# Patient Record
Sex: MALE | Race: BLACK OR AFRICAN AMERICAN | Employment: FULL TIME | URBAN - METROPOLITAN AREA
[De-identification: names, ages, dates, MRNs, and addresses within clinical notes are randomized per-mention and may not be internally consistent; named-entity substitution may affect disease eponyms.]

---

## 2017-06-08 ENCOUNTER — OFFICE VISIT (OUTPATIENT)
Dept: INTERNAL MEDICINE CLINIC | Age: 27
End: 2017-06-08

## 2017-06-08 ENCOUNTER — OFFICE VISIT (OUTPATIENT)
Dept: ORTHOPEDIC SURGERY | Age: 27
End: 2017-06-08

## 2017-06-08 VITALS
HEIGHT: 68 IN | RESPIRATION RATE: 16 BRPM | WEIGHT: 235 LBS | BODY MASS INDEX: 35.61 KG/M2 | OXYGEN SATURATION: 98 % | SYSTOLIC BLOOD PRESSURE: 137 MMHG | TEMPERATURE: 98.8 F | HEART RATE: 72 BPM | DIASTOLIC BLOOD PRESSURE: 79 MMHG

## 2017-06-08 VITALS
DIASTOLIC BLOOD PRESSURE: 85 MMHG | BODY MASS INDEX: 35.61 KG/M2 | OXYGEN SATURATION: 97 % | SYSTOLIC BLOOD PRESSURE: 145 MMHG | WEIGHT: 235 LBS | TEMPERATURE: 98.6 F | RESPIRATION RATE: 16 BRPM | HEIGHT: 68 IN | HEART RATE: 89 BPM

## 2017-06-08 DIAGNOSIS — M25.561 ACUTE PAIN OF RIGHT KNEE: Primary | ICD-10-CM

## 2017-06-08 DIAGNOSIS — M25.461 EFFUSION OF RIGHT KNEE: ICD-10-CM

## 2017-06-08 DIAGNOSIS — S83.004A PATELLAR DISLOCATION, RIGHT, INITIAL ENCOUNTER: Primary | ICD-10-CM

## 2017-06-08 RX ORDER — HYDROCODONE BITARTRATE AND ACETAMINOPHEN 7.5; 325 MG/1; MG/1
1 TABLET ORAL
Qty: 63 TAB | Refills: 0 | Status: SHIPPED | OUTPATIENT
Start: 2017-06-08 | End: 2017-06-29

## 2017-06-08 RX ORDER — HYDROCODONE BITARTRATE AND ACETAMINOPHEN 5; 325 MG/1; MG/1
TABLET ORAL
COMMUNITY
Start: 2017-06-04 | End: 2017-06-08 | Stop reason: DRUGHIGH

## 2017-06-08 RX ORDER — IBUPROFEN 800 MG/1
800 TABLET ORAL
Qty: 90 TAB | Refills: 1 | Status: SHIPPED | OUTPATIENT
Start: 2017-06-08

## 2017-06-08 RX ORDER — IBUPROFEN 800 MG/1
800 TABLET ORAL
COMMUNITY
Start: 2017-06-04 | End: 2017-06-08 | Stop reason: SDUPTHER

## 2017-06-08 NOTE — PROGRESS NOTES
Pt presented today with right knee injury on June 4, 2017, pt was seen at [address] [place] [address] . Has pt had any falls since last visit? No.  Pt preferred language for health care discussion is english. Advanced Directive? No    Is someone accompanying this pt? No    Is the patient using any DME equipment during OV? No      1. Have you been to the ER, urgent care clinic since your last visit? Hospitalized since your last visit? Yes When: June 4, 2017 [facility] ER knee injury    2. Have you seen or consulted any other health care providers outside of the [address] [place] [address] since your last visit? Include any pap smears or colon screening. No      Pt has a reminder for a \"due or due soon\" health maintenance. I have asked that he contact his primary care provider for follow-up on this health maintenance.

## 2017-06-08 NOTE — PATIENT INSTRUCTIONS
Knee Pain or Injury: Care Instructions  Your Care Instructions    Injuries are a common cause of knee problems. Sudden (acute) injuries may be caused by a direct blow to the knee. They can also be caused by abnormal twisting, bending, or falling on the knee. Pain, bruising, or swelling may be severe, and may start within minutes of the injury. Overuse is another cause of knee pain. Other causes are climbing stairs, kneeling, and other activities that use the knee. Everyday wear and tear, especially as you get older, also can cause knee pain. Rest, along with home treatment, often relieves pain and allows your knee to heal. If you have a serious knee injury, you may need tests and treatment. Follow-up care is a key part of your treatment and safety. Be sure to make and go to all appointments, and call your doctor if you are having problems. It's also a good idea to know your test results and keep a list of the medicines you take. How can you care for yourself at home? · Be safe with medicines. Read and follow all instructions on the label. ¨ If the doctor gave you a prescription medicine for pain, take it as prescribed. ¨ If you are not taking a prescription pain medicine, ask your doctor if you can take an over-the-counter medicine. · Rest and protect your knee. Take a break from any activity that may cause pain. · Put ice or a cold pack on your knee for 10 to 20 minutes at a time. Put a thin cloth between the ice and your skin. · Prop up a sore knee on a pillow when you ice it or anytime you sit or lie down for the next 3 days. Try to keep it above the level of your heart. This will help reduce swelling. · If your knee is not swollen, you can put moist heat, a heating pad, or a warm cloth on your knee. · If your doctor recommends an elastic bandage, sleeve, or other type of support for your knee, wear it as directed.   · Follow your doctor's instructions about how much weight you can put on your leg. Use a cane, crutches, or a walker as instructed. · Follow your doctor's instructions about activity during your healing process. If you can do mild exercise, slowly increase your activity. · Reach and stay at a healthy weight. Extra weight can strain the joints, especially the knees and hips, and make the pain worse. Losing even a few pounds may help. When should you call for help? Call 911 anytime you think you may need emergency care. For example, call if:  · You have symptoms of a blood clot in your lung (called a pulmonary embolism). These may include:  ¨ Sudden chest pain. ¨ Trouble breathing. ¨ Coughing up blood. Call your doctor now or seek immediate medical care if:  · You have severe or increasing pain. · Your leg or foot turns cold or changes color. · You cannot stand or put weight on your knee. · Your knee looks twisted or bent out of shape. · You cannot move your knee. · You have signs of infection, such as:  ¨ Increased pain, swelling, warmth, or redness. ¨ Red streaks leading from the knee. ¨ Pus draining from a place on your knee. ¨ A fever. · You have signs of a blood clot in your leg (called a deep vein thrombosis), such as:  ¨ Pain in your calf, back of the knee, thigh, or groin. ¨ Redness and swelling in your leg or groin. Watch closely for changes in your health, and be sure to contact your doctor if:  · You have tingling, weakness, or numbness in your knee. · You have any new symptoms, such as swelling. · You have bruises from a knee injury that last longer than 2 weeks. · You do not get better as expected. Where can you learn more? Go to http://jenna-wilian.info/. Enter K195 in the search box to learn more about \"Knee Pain or Injury: Care Instructions. \"  Current as of: May 27, 2016  Content Version: 11.2  © 2268-8205 [employer].  Care instructions adapted under license by [employer] (which disclaims liability or warranty for this information). If you have questions about a medical condition or this instruction, always ask your healthcare professional. [address] any warranty or liability for your use of this information. Learning About RICE (Rest, Ice, Compression, and Elevation)  What is RICE? RICE is a way to care for an injury. RICE helps relieve pain and swelling. It may also help with healing and flexibility. RICE stands for:  · Rest and protect the injured or sore area. · Ice or a cold pack used as soon as possible. · Compression, or wrapping the injured or sore area with an elastic bandage. · Elevation (propping up) the injured or sore area. How do you do RICE? You can use RICE for home treatment when you have general aches and pains or after an injury or surgery. Rest  · Do not put weight on the injury for at least 24 to 48 hours. · Use crutches for a badly sprained knee or ankle. · Support a sprained wrist, elbow, or shoulder with a sling. Ice  · Put ice or a cold pack on the injury right away to reduce pain and swelling. Frozen vegetables will also work as an ice pack. Put a thin cloth between the ice or cold pack and your skin. The cloth protects the injured area from getting too cold. · Use ice for 10 to 15 minutes at a time for the first 48 to 72 hours. Compression  · Use compression for sprains, strains, and surgeries of the arms and legs. · Wrap the injured area with an elastic bandage or compression sleeve to reduce swelling. · Don't wrap it too tightly. If the area below it feels numb, tingles, or feels cool, loosen the wrap. Elevation  · Use elevation for areas of the body that can be propped up, such as arms and legs. · Prop up the injured area on pillows whenever you use ice. Keep it propped up anytime you sit or lie down. · Try to keep the injured area at or above the level of your heart. This will help reduce swelling and bruising. Where can you learn more?   Go to http://phil.info/. Enter C225 in the search box to learn more about \"Learning About RICE (Rest, Ice, Compression, and Elevation). \"  Current as of: May 23, 2016  Content Version: 11.2  © 1218-3543 [employer]. Care instructions adapted under license by [employer] (which disclaims liability or warranty for this information). If you have questions about a medical condition or this instruction, always ask your healthcare professional. [place] [address] any warranty or liability for your use of this information.

## 2017-06-08 NOTE — PROGRESS NOTES
HISTORY OF PRESENT ILLNESS  Mansoor Mccartney is a 31 y.o. male. Patient presents with traumatic right knee pain x 4 days. States he he had a knee injury while he was playing basketball,wemt to [facility], was [facility] and sent home with knee immobilizer, crutches and was given two days off work. 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 States he is still taking the Norco but only has two more pills left. States he is still has Ibuprofen. Patient rates right knee pain as 8/10. Evident right knee swelling with effusion, painful ROM and flexion,good extension. Patient denies any numbness,tingling. Knee Injury    The history is provided by the patient. This is a new problem. The current episode started more than 2 days ago. The problem occurs constantly. The problem has been gradually worsening. The pain is present in the right knee. The pain is at a severity of 8/10. Associated symptoms include limited range of motion. Pertinent negatives include no numbness. Treatments tried: Norco, Ibuprofen. The treatment provided mild relief. There has been a history of trauma. Review of Systems   Constitutional: Negative. HENT: Negative. Eyes: Negative. Respiratory: Negative. Cardiovascular: Negative. Gastrointestinal: Negative. Genitourinary: Negative. Musculoskeletal: Positive for joint pain. Right knee swelling with effusion, painful ROM and flexion,good extension. Patient denies any numbness,tingling, good cap refill and good sensation. Skin: Negative. Neurological: Negative. Negative for numbness. Psychiatric/Behavioral: Negative. Physical Exam   Constitutional: He is oriented to person, place, and time. He appears well-developed and well-nourished.    /85 (BP 1 Location: Right arm, BP Patient Position: Sitting)  Pulse 89  Temp 98.6 °F (37 °C) (Oral)   Resp 16  Ht 5' 8\" (1.727 m)  Wt 235 lb (106.6 kg)  SpO2 97%  BMI 35.73 kg/m2     HENT:   Head: Normocephalic and atraumatic. Eyes: Conjunctivae and EOM are normal. Pupils are equal, round, and reactive to light. Neck: Normal range of motion. Cardiovascular: Normal rate. Pulmonary/Chest: Effort normal and breath sounds normal.   Abdominal: Soft. Bowel sounds are normal.   Musculoskeletal:        Right knee: He exhibits decreased range of motion, swelling and effusion. Tenderness found. Neurological: He is alert and oriented to person, place, and time. Skin: Skin is warm and dry. Psychiatric: He has a normal mood and affect. His behavior is normal. Judgment and thought content normal.   Vitals reviewed. ASSESSMENT and PLAN    ICD-10-CM ICD-9-CM    1. Acute pain of right knee M25.561 719.46 REFERRAL TO ORTHOPEDICS     Encounter Diagnoses   Name Primary?  Acute pain of right knee Yes     Orders Placed This Encounter    REFERRAL TO ORTHOPEDICS    HYDROcodone-acetaminophen (NORCO) 5-325 mg per tablet    ibuprofen (MOTRIN) 800 mg tablet     Orders Placed This Encounter    REFERRAL TO ORTHOPEDICS     Referral Priority:   Urgent     Referral Type:   Consultation     Referral Reason:   Specialty Services Required     Referral Location:   [facility]     Referred to Provider:   Nico Gregg DO     Orders Placed This Encounter    REFERRAL TO [facility] was seen today for knee injury. Diagnoses and all orders for this visit:    Acute pain of right knee  -     REFERRAL TO ORTHOPEDICS      Follow-up Disposition:  Return if symptoms worsen or fail to improve.   current treatment plan is effective, no change in therapy  the following changes in treatment are made: Same day referral to in house ortho

## 2017-06-08 NOTE — MR AVS SNAPSHOT
Visit Information Date & Time Provider Department Dept. Phone Encounter #  
 6/8/2017 10:20 AM Sherman Lopez, [phone] [id number] Follow-up Instructions Return in about 3 weeks (around 6/29/2017) for patellar dislocation. Routing History Follow-up and Disposition History Upcoming Health Maintenance Date Due DTaP/Tdap/Td series (1 - Tdap) 10/31/2010 INFLUENZA AGE 9 TO ADULT 8/1/2017 Allergies as of 6/8/2017  Review Complete On: 6/8/2017 By: Sherman Lopez DO No Known Allergies Current Immunizations  Never Reviewed No immunizations on file. Not reviewed this visit You Were Diagnosed With   
  
 Codes Comments Patellar dislocation, right, initial encounter    -  Primary ICD-10-CM: Y78.913I ICD-9-CM: 836.3 Effusion of right knee     ICD-10-CM: M25.461 ICD-9-CM: 719.06 Vitals BP Pulse Temp Resp Height(growth percentile) Weight(growth percentile)  
 137/79 (BP 1 Location: Right arm, BP Patient Position: Sitting) 72 98.8 °F (37.1 °C) (Oral) 16 5' 8\" (1.727 m) 235 lb (106.6 kg) SpO2 BMI Smoking Status 98% 35.73 kg/m2 Never Smoker Vitals History BMI and BSA Data Body Mass Index Body Surface Area 35.73 kg/m 2 2.26 m 2 Preferred Pharmacy Pharmacy Name Phone [id number] [phone] Your Updated Medication List  
  
   
This list is accurate as of: 6/8/17 11:29 AM.  Always use your most recent med list.  
  
  
  
  
 chlorpheniramine-HYDROcodone 10-8 mg/5 mL suspension Commonly known as:  Petar Taos Take 5 mL by mouth every twelve (12) hours as needed for Cough. HYDROcodone-acetaminophen 7.5-325 mg per tablet Commonly known as:  Ann Schlichter Take 1 Tab by mouth every eight (8) hours as needed for Pain for up to 21 days. Max Daily Amount: 3 Tabs. ibuprofen 800 mg tablet Commonly known as:  MOTRIN Take 1 Tab by mouth every eight (8) hours as needed for Pain. MUCINEX 600 mg SR tablet Generic drug:  guaiFENesin SR Take 600 mg by mouth two (2) times a day. Prescriptions Printed Refills HYDROcodone-acetaminophen (NORCO) 7.5-325 mg per tablet 0 Sig: Take 1 Tab by mouth every eight (8) hours as needed for Pain for up to 21 days. Max Daily Amount: 3 Tabs. Class: Print Route: Oral  
  
Prescriptions Sent to Pharmacy Refills  
 ibuprofen (MOTRIN) 800 mg tablet 1 Sig: Take 1 Tab by mouth every eight (8) hours as needed for Pain. Class: Normal  
 Pharmacy: [address] [facility], [address] [facility]  #: [phone] Route: Oral  
  
We Performed the Following REFERRAL TO PHYSICAL THERAPY [DEM54 Custom] Comments:  
 Eval and Tx 
 
3 per week for 4-6 weeks Iontophoresis and/or dry needling if indicated. [phone] Follow-up Instructions Return in about 3 weeks (around 6/29/2017) for patellar dislocation. Referral Information Referral ID Referred By Referred To  
  
 [id number]   
   [address]   
   [address] [facility], [address] [place] [address] Phone: [phone] Fax: [phone] Visits Status Start Date End Date 1 New Request 6/8/17 6/8/18 If your referral has a status of pending review or denied, additional information will be sent to support the outcome of this decision. Patient Instructions Ice 15-20 minutes at night to get rid of swelling, then neoprene Knee Sleeve during the day to keep it from coming back. May take off at night. If slipping down can fold an inch of the top over or spray knee with a little hair spray prior to putting on. Kneecap Dislocation: Care Instructions Your Care Instructions A sudden twisting or a blow can cause the kneecap (patella) to move out of its normal position. This is called a dislocation. It can happen because of a sports injurysuch as turning suddenly while runningor an accident. Rest and home treatment can help you heal and return to your normal activity, usually within 3 to 6 weeks. But you need to be careful after you heal. Now that your kneecap has been dislocated, it can more easily go out of position again. Follow-up care is a key part of your treatment and safety. Be sure to make and go to all appointments, and call your doctor if you are having problems. It's also a good idea to know your test results and keep a list of the medicines you take. How can you care for yourself at home? · Take pain medicines exactly as directed. ¨ If the doctor gave you a prescription medicine for pain, take it as prescribed. ¨ If you are not taking a prescription pain medicine, ask your doctor if you can take an over-the-counter medicine. · Rest your knee by not putting weight on your leg until your doctor says it is okay. · Follow instructions for using crutches. · Your doctor may recommend a brace (immobilizer) or elastic bandage to support your knee while it heals. Wear it as directed. · If you have an elastic bandage, make sure it is snug but not so tight that your leg is numb, tingles, or swells below the bandage. You can loosen the bandage if it is too tight. · Put ice or a cold pack on your knee for 10 to 20 minutes at a time. Try to do this every 1 to 2 hours for the next 3 days (when you are awake) or until the swelling goes down. Put a thin cloth between the ice pack and your skin. Do not get the brace or elastic bandage wet. · Prop up your leg on a pillow when you ice it or anytime you sit or lie down for the next 3 days. Try to keep it above the level of your heart. This will help reduce swelling. · Go to physical therapy if your doctor suggests it. Follow your therapist's instruction for home exercises. When should you call for help? Call 911 anytime you think you may need emergency care. For example, call if: 
· You have sudden chest pain and shortness of breath, or you cough up blood. Call your doctor now or seek immediate medical care if: 
· You have signs that your kneecap may be dislocated again, including: ¨ Severe pain. ¨ A misshapen knee that looks like a bone is out of position. ¨ Not being able to bend or straighten the knee. ¨ Not being able to walk or bear weight on the knee. · Your foot is cool or pale or changes color. · You cannot feel or move your toes or ankle. · You have signs of a blood clot, such as: 
¨ Pain in your calf, back of the knee, thigh, or groin. ¨ Redness and swelling in your leg. Watch closely for changes in your health, and be sure to contact your doctor if: 
· Your pain and swelling get worse. Where can you learn more? Go to http://jenna-wilian.info/. Enter E319 in the search box to learn more about \"Kneecap Dislocation: Care Instructions. \" Current as of: May 23, 2016 Content Version: 11.2 © 4399-1926 [employer]. Care instructions adapted under license by [employer] (which disclaims liability or warranty for this information). If you have questions about a medical condition or this instruction, always ask your healthcare professional. [place] [address] any warranty or liability for your use of this information. Introducing [facility]! [facility] introduces [employer] patient portal. Now you can access parts of your medical record, email your doctor's office, and request medication refills online. 1. In your internet browser, go to [email or web address] 2. Click on the First Time User? Click Here link in the Sign In box. You will see the New Member Sign Up page. 3. Enter your [employer] Access Code exactly as it appears below.  You will not need to use this code after youve completed the sign-up process. If you do not sign up before the expiration date, you must request a new code. · [facility] Access Code: [email or web address] Expires: 9/6/2017 10:17 AM 
 
4. Enter the last four digits of your Social Security Number (xxxx) and Date of Birth (mm/dd/yyyy) as indicated and click Submit. You will be taken to the next sign-up page. 5. Create a [facility] ID. This will be your [facility] login ID and cannot be changed, so think of one that is secure and easy to remember. 6. Create a [facility] password. You can change your password at any time. 7. Enter your Password Reset Question and Answer. This can be used at a later time if you forget your password. 8. Enter your e-mail address. You will receive e-mail notification when new information is available in [address] 9. Click Sign Up. You can now view and download portions of your medical record. 10. Click the Download Summary menu link to download a portable copy of your medical information. If you have questions, please visit the Frequently Asked Questions section of the [facility] website. Remember, [facility] is NOT to be used for urgent needs. For medical emergencies, dial 911. Now available from your iPhone and Android! Please provide this summary of care documentation to your next provider. Your primary care clinician is listed as Hannah Harmon. If you have any questions after today's visit, please call [phone].

## 2017-06-08 NOTE — LETTER
NOTIFICATION RETURN TO WORK / SCHOOL 
 
6/8/2017 11:19 AM 
 
Mr. Kahlil Moy [address] [facility] [id number] To Whom It May Concern: 
 
Kahlil Moy is currently under the care of [address] [place] [address]. He will return to work/school once condition improves. Likely approximately 4 weeks. If there are questions or concerns please have the patient contact our office.  
 
 
 
Sincerely, 
 
 
Cyndie Delgadillo, DO

## 2017-06-08 NOTE — PROGRESS NOTES
1. Have you been to the ER, urgent care clinic since your last visit? Hospitalized since your last visit? new to  practice    2. Have you seen or consulted any other health care providers outside of the [address] [place] [address] since your last visit? Include any pap smears or colon screening.   new to  practice

## 2017-06-08 NOTE — PATIENT INSTRUCTIONS
Ice 15-20 minutes at night to get rid of swelling, then neoprene Knee Sleeve during the day to keep it from coming back. May take off at night. If slipping down can fold an inch of the top over or spray knee with a little hair spray prior to putting on. Kneecap Dislocation: Care Instructions  Your Care Instructions    A sudden twisting or a blow can cause the kneecap (patella) to move out of its normal position. This is called a dislocation. It can happen because of a sports injury--such as turning suddenly while running--or an accident. Rest and home treatment can help you heal and return to your normal activity, usually within 3 to 6 weeks. But you need to be careful after you heal. Now that your kneecap has been dislocated, it can more easily go out of position again. Follow-up care is a key part of your treatment and safety. Be sure to make and go to all appointments, and call your doctor if you are having problems. It's also a good idea to know your test results and keep a list of the medicines you take. How can you care for yourself at home? · Take pain medicines exactly as directed. ¨ If the doctor gave you a prescription medicine for pain, take it as prescribed. ¨ If you are not taking a prescription pain medicine, ask your doctor if you can take an over-the-counter medicine. · Rest your knee by not putting weight on your leg until your doctor says it is okay. · Follow instructions for using crutches. · Your doctor may recommend a brace (immobilizer) or elastic bandage to support your knee while it heals. Wear it as directed. · If you have an elastic bandage, make sure it is snug but not so tight that your leg is numb, tingles, or swells below the bandage. You can loosen the bandage if it is too tight. · Put ice or a cold pack on your knee for 10 to 20 minutes at a time. Try to do this every 1 to 2 hours for the next 3 days (when you are awake) or until the swelling goes down.  Put a thin cloth between the ice pack and your skin. Do not get the brace or elastic bandage wet. · Prop up your leg on a pillow when you ice it or anytime you sit or lie down for the next 3 days. Try to keep it above the level of your heart. This will help reduce swelling. · Go to physical therapy if your doctor suggests it. Follow your therapist's instruction for home exercises. When should you call for help? Call 911 anytime you think you may need emergency care. For example, call if:  · You have sudden chest pain and shortness of breath, or you cough up blood. Call your doctor now or seek immediate medical care if:  · You have signs that your kneecap may be dislocated again, including:  ¨ Severe pain. ¨ A misshapen knee that looks like a bone is out of position. ¨ Not being able to bend or straighten the knee. ¨ Not being able to walk or bear weight on the knee. · Your foot is cool or pale or changes color. · You cannot feel or move your toes or ankle. · You have signs of a blood clot, such as:  ¨ Pain in your calf, back of the knee, thigh, or groin. ¨ Redness and swelling in your leg. Watch closely for changes in your health, and be sure to contact your doctor if:  · Your pain and swelling get worse. Where can you learn more? Go to http://jenna-wilian.info/. Enter K408 in the search box to learn more about \"Kneecap Dislocation: Care Instructions. \"  Current as of: May 23, 2016  Content Version: 11.2  © 1596-7562 Healthwise, Incorporated. Care instructions adapted under license by [employer] (which disclaims liability or warranty for this information). If you have questions about a medical condition or this instruction, always ask your healthcare professional. [place] [address] any warranty or liability for your use of this information.

## 2017-06-08 NOTE — MR AVS SNAPSHOT
Visit Information Date & Time Provider Department Dept. Phone Encounter #  
 6/8/2017  9:45 AM Dafne Hidalgo NP e[facility]metry [phone] [id number] Follow-up Instructions Return if symptoms worsen or fail to improve. Upcoming Health Maintenance Date Due DTaP/Tdap/Td series (1 - Tdap) 10/31/2010 INFLUENZA AGE 9 TO ADULT 8/1/2017 Allergies as of 6/8/2017  Review Complete On: 6/8/2017 By: Meredith Newton LPN No Known Allergies Current Immunizations  Never Reviewed No immunizations on file. Not reviewed this visit You Were Diagnosed With   
  
 Codes Comments Acute pain of right knee    -  Primary ICD-10-CM: M25.561 ICD-9-CM: 719.46 Vitals BP Pulse Temp Resp Height(growth percentile) Weight(growth percentile) 145/85 (BP 1 Location: Right arm, BP Patient Position: Sitting) 89 98.6 °F (37 °C) (Oral) 16 5' 8\" (1.727 m) 235 lb (106.6 kg) SpO2 BMI Smoking Status 97% 35.73 kg/m2 Never Smoker Vitals History BMI and BSA Data Body Mass Index Body Surface Area 35.73 kg/m 2 2.26 m 2 Preferred Pharmacy Pharmacy Name Phone [id number], [address] [place] [address] [phone] Your Updated Medication List  
  
   
This list is accurate as of: 6/8/17 10:17 AM.  Always use your most recent med list.  
  
  
  
  
 chlorpheniramine-HYDROcodone 10-8 mg/5 mL suspension Commonly known as:  Lesleigh Frohna Take 5 mL by mouth every twelve (12) hours as needed for Cough. HYDROcodone-acetaminophen 5-325 mg per tablet Commonly known as:  Mitra Gowers Take 1 Tab by Mouth Every 4 Hours As Needed for up to 10 days. ibuprofen 800 mg tablet Commonly known as:  MOTRIN  
800 mg. MUCINEX 600 mg SR tablet Generic drug:  guaiFENesin SR Take 600 mg by mouth two (2) times a day. We Performed the Following REFERRAL TO ORTHOPEDICS [QTM283 Custom] Comments:  
 Please evaluate patient for Traumatic right knee pain x 4 days with exacerbation. Follow-up Instructions Return if symptoms worsen or fail to improve. Referral Information Referral ID Referred By Referred To  
  
 [id number] [address] [facility], [address] [place] [address] [facility] [phone] [facility] [place] Phone: [phone] Fax: [phone] Visits Status Start Date End Date 1 New Request 6/8/17 6/8/18 If your referral has a status of pending review or denied, additional information will be sent to support the outcome of this decision. Patient Instructions Knee Pain or Injury: Care Instructions Your Care Instructions Injuries are a common cause of knee problems. Sudden (acute) injuries may be caused by a direct blow to the knee. They can also be caused by abnormal twisting, bending, or falling on the knee. Pain, bruising, or swelling may be severe, and may start within minutes of the injury. Overuse is another cause of knee pain. Other causes are climbing stairs, kneeling, and other activities that use the knee. Everyday wear and tear, especially as you get older, also can cause knee pain. Rest, along with home treatment, often relieves pain and allows your knee to heal. If you have a serious knee injury, you may need tests and treatment. Follow-up care is a key part of your treatment and safety. Be sure to make and go to all appointments, and call your doctor if you are having problems. It's also a good idea to know your test results and keep a list of the medicines you take. How can you care for yourself at home? · Be safe with medicines. Read and follow all instructions on the label. ¨ If the doctor gave you a prescription medicine for pain, take it as prescribed.  
¨ If you are not taking a prescription pain medicine, ask your doctor if you can take an over-the-counter medicine. · Rest and protect your knee. Take a break from any activity that may cause pain. · Put ice or a cold pack on your knee for 10 to 20 minutes at a time. Put a thin cloth between the ice and your skin. · Prop up a sore knee on a pillow when you ice it or anytime you sit or lie down for the next 3 days. Try to keep it above the level of your heart. This will help reduce swelling. · If your knee is not swollen, you can put moist heat, a heating pad, or a warm cloth on your knee. · If your doctor recommends an elastic bandage, sleeve, or other type of support for your knee, wear it as directed. · Follow your doctor's instructions about how much weight you can put on your leg. Use a cane, crutches, or a walker as instructed. · Follow your doctor's instructions about activity during your healing process. If you can do mild exercise, slowly increase your activity. · Reach and stay at a healthy weight. Extra weight can strain the joints, especially the knees and hips, and make the pain worse. Losing even a few pounds may help. When should you call for help? Call 911 anytime you think you may need emergency care. For example, call if: 
· You have symptoms of a blood clot in your lung (called a pulmonary embolism). These may include: 
¨ Sudden chest pain. ¨ Trouble breathing. ¨ Coughing up blood. Call your doctor now or seek immediate medical care if: 
· You have severe or increasing pain. · Your leg or foot turns cold or changes color. · You cannot stand or put weight on your knee. · Your knee looks twisted or bent out of shape. · You cannot move your knee. · You have signs of infection, such as: 
¨ Increased pain, swelling, warmth, or redness. ¨ Red streaks leading from the knee. ¨ Pus draining from a place on your knee. ¨ A fever. · You have signs of a blood clot in your leg (called a deep vein thrombosis), such as: ¨ Pain in your calf, back of the knee, thigh, or groin. ¨ Redness and swelling in your leg or groin. Watch closely for changes in your health, and be sure to contact your doctor if: 
· You have tingling, weakness, or numbness in your knee. · You have any new symptoms, such as swelling. · You have bruises from a knee injury that last longer than 2 weeks. · You do not get better as expected. Where can you learn more? Go to http://jenna-wilian.info/. Enter K195 in the search box to learn more about \"Knee Pain or Injury: Care Instructions. \" Current as of: May 27, 2016 Content Version: 11.2 © 8992-1702 [employer]. Care instructions adapted under license by [employer] (which disclaims liability or warranty for this information). If you have questions about a medical condition or this instruction, always ask your healthcare professional. [place] [address] any warranty or liability for your use of this information. Learning About RICE (Rest, Ice, Compression, and Elevation) What is RICE? RICE is a way to care for an injury. RICE helps relieve pain and swelling. It may also help with healing and flexibility. RICE stands for: · Rest and protect the injured or sore area. · Ice or a cold pack used as soon as possible. · Compression, or wrapping the injured or sore area with an elastic bandage. · Elevation (propping up) the injured or sore area. How do you do RICE? You can use RICE for home treatment when you have general aches and pains or after an injury or surgery. Rest 
· Do not put weight on the injury for at least 24 to 48 hours. · Use crutches for a badly sprained knee or ankle. · Support a sprained wrist, elbow, or shoulder with a sling. Ice · Put ice or a cold pack on the injury right away to reduce pain and swelling. Frozen vegetables will also work as an ice pack.  Put a thin cloth between the ice or cold pack and your skin. The cloth protects the injured area from getting too cold. · Use ice for 10 to 15 minutes at a time for the first 48 to 72 hours. Compression · Use compression for sprains, strains, and surgeries of the arms and legs. · Wrap the injured area with an elastic bandage or compression sleeve to reduce swelling. · Don't wrap it too tightly. If the area below it feels numb, tingles, or feels cool, loosen the wrap. Elevation · Use elevation for areas of the body that can be propped up, such as arms and legs. · Prop up the injured area on pillows whenever you use ice. Keep it propped up anytime you sit or lie down. · Try to keep the injured area at or above the level of your heart. This will help reduce swelling and bruising. Where can you learn more? Go to http://jenna-wilian.info/. Enter F465 in the search box to learn more about \"Learning About RICE (Rest, Ice, Compression, and Elevation). \" 
Current as of: May 23, 2016 Content Version: 11.2 © 5271-4417 [employer]. Care instructions adapted under license by [employer] (which disclaims liability or warranty for this information). If you have questions about a medical condition or this instruction, always ask your healthcare professional. [address] any warranty or liability for your use of this information. Introducing [facility]! Lashawn Crowe introduces [employer] patient portal. Now you can access parts of your medical record, email your doctor's office, and request medication refills online. 1. In your internet browser, go to [email or web address] 2. Click on the First Time User? Click Here link in the Sign In box. You will see the New Member Sign Up page. 3. Enter your [employer] Access Code exactly as it appears below. You will not need to use this code after youve completed the sign-up process.  If you do not sign up before the expiration date, you must request a new code. · [employer] Access Code: [email or web address] Expires: 9/6/2017 10:17 AM 
 
4. Enter the last four digits of your Social Security Number (xxxx) and Date of Birth (mm/dd/yyyy) as indicated and click Submit. You will be taken to the next sign-up page. 5. Create a [employer] ID. This will be your [employer] login ID and cannot be changed, so think of one that is secure and easy to remember. 6. Create a [employer] password. You can change your password at any time. 7. Enter your Password Reset Question and Answer. This can be used at a later time if you forget your password. 8. Enter your e-mail address. You will receive e-mail notification when new information is available in [address] 9. Click Sign Up. You can now view and download portions of your medical record. 10. Click the Download Summary menu link to download a portable copy of your medical information. If you have questions, please visit the Frequently Asked Questions section of the [employer] website. Remember, [employer] is NOT to be used for urgent needs. For medical emergencies, dial 911. Now available from your iPhone and Android! Please provide this summary of care documentation to your next provider. Your primary care clinician is listed as [place]. If you have any questions after today's visit, please call [phone].

## 2017-06-08 NOTE — PROGRESS NOTES
HISTORY OF PRESENT ILLNESS    [place] Rock is a 31y.o. year old male comes in today as new patient to be evaluated and treated at the request of NP Castro Kramer for my opinion/advice regarding: Right knee pain    [place] [facility] [place] right stop and pivoted to left and knee cap shifted out. Went to ER and was relocated while getting Xray. Per records normal xray and Tx immob, crutches and motrin/norco.  Had been out of work until Tuesday but as a  cannot work in immob and pain bad getting into and out of crutches. No Hx prior. Rated 8/10. Social History     Social History    Marital status:      Spouse name: N/A    Number of children: N/A    Years of education: N/A     Social History Main Topics    Smoking status: Never Smoker    Smokeless tobacco: Never Used    Alcohol use No    Drug use: No    Sexual activity: Yes     Partners: Female     Birth control/ protection: Condom     Other Topics Concern    Not on file     Social History Narrative     Current Outpatient Prescriptions   Medication Sig Dispense Refill    HYDROcodone-acetaminophen (NORCO) 5-325 mg per tablet Take 1 Tab by Mouth Every 4 Hours As Needed for up to 10 days.  ibuprofen (MOTRIN) 800 mg tablet 800 mg.  chlorpheniramine-HYDROcodone (TUSSIONEX) 8-10 mg/5 mL suspension Take 5 mL by mouth every twelve (12) hours as needed for Cough. 120 mL 0    guaifenesin SR (MUCINEX) 600 mg SR tablet Take 600 mg by mouth two (2) times a day. No past medical history on file. No family history on file. ROS:  + swell. All other systems reviewed and negative aside from that written in the HPI. Objective:  Visit Vitals    Resp 16    Ht 5' 8\" (1.727 m)    Wt 235 lb (106.6 kg)    BMI 35.73 kg/m2     HEENT: Conjunctiva/lids WNL. External canals/nares WNL. Tongue midline. PERRL, EOMI. Hearing intact. NECK: Trachea midline. Supple, Full ROM. No thyromegaly.   CARDIAC: no edema  LUNGS: normal effort. ABD: Soft, no masses. No HSM. PSYCH: A+O x3. Appropriate judgment and insight. GEN: Appears stated age in NAD. HEAD:  Normocephalic, atraumatic. NEURO:  Sensation intact light touch B/L lower extremities. MS:  LE Strength +5/5 bilateral .   right Knee:  3+ Effusion . Lachman negative. Valgus negative. Varus negative. negative joint line tenderness medial, lateral.  Lexi negative. Posterior drawer negative. Noble compression test negative. Patellar apprehension positive. Patellar facet  positive tender to palpation medial, lateral no crepitance bilateral .  EXT: no clubbing/cyanosis. no edema. SKIN: Warm/dry without rash. Assessment/Plan:     ICD-10-CM ICD-9-CM    1. Patellar dislocation, right, initial encounter S83.004A 836.3 REFERRAL TO PHYSICAL THERAPY      HYDROcodone-acetaminophen (NORCO) 7.5-325 mg per tablet      ibuprofen (MOTRIN) 800 mg tablet   2. Effusion of right knee M25.461 719.06 REFERRAL TO PHYSICAL THERAPY      HYDROcodone-acetaminophen (NORCO) 7.5-325 mg per tablet      ibuprofen (MOTRIN) 800 mg tablet       Patient verbalizes understanding of evaluation and plan. Letter off work until reevaluated and start PT with refill norco and iburprofen. Immob PRN and crutches and sleeve for swelling with ice often.

## 2017-06-08 NOTE — PROGRESS NOTES
Patient presents with traumatic right knee pain x 4 days. States he he had a knee injury while he was playing basketball,wemt to [facility] ED, was [facility] and sent home with knee immobilizer, crutches and was given two days off work. 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 States he is still taking the Norco but only has two more pills left. States he is still has Ibuprofen. Patient rates right knee pain as 8/10. Evident right knee swelling with effusion, painful ROM and flexion,good extension. Patient denies any numbness,tingling.

## 2017-06-09 ENCOUNTER — TELEPHONE (OUTPATIENT)
Dept: ORTHOPEDIC SURGERY | Age: 27
End: 2017-06-09

## 2017-06-09 NOTE — TELEPHONE ENCOUNTER
Called patient back and informed him that he can drop paperwork off by JAIME and he will out when he comes in on Tuesday.  Patient also asked when he is to schedule patient was advised to schedule after MRI

## 2017-06-16 ENCOUNTER — HOSPITAL ENCOUNTER (OUTPATIENT)
Dept: PHYSICAL THERAPY | Age: 27
Discharge: HOME | End: 2017-06-16
Payer: COMMERCIAL

## 2017-06-16 PROCEDURE — 97530 THERAPEUTIC ACTIVITIES: CPT

## 2017-06-16 PROCEDURE — 97110 THERAPEUTIC EXERCISES: CPT

## 2017-06-16 PROCEDURE — 97161 PT EVAL LOW COMPLEX 20 MIN: CPT

## 2017-06-16 NOTE — PROGRESS NOTES
[facility] PHYSICAL THERAPY  [address] [place] [address], [address] - Phone: [phone]  Fax: [phone] / 0932 [facility]  Patient Name: Saroj Beyer : 1990   Medical   Diagnosis: Right knee pain [M25.561] Treatment Diagnosis: R patellar dislocation   Onset Date: 17     Referral Source: Desiree Vance DO Start of Care [facility], [place]): 2017   Prior Hospitalization: See medical history Provider #: [id number]   Prior Level of Function: Played basketball once a week, minimal upper body weight lifting once a week,    Comorbidities: Increased BMI   Medications: Verified on Patient Summary List   The Plan of Care and following information is based on the information from the initial evaluation.   ===========================================================================================  Assessment / key information:  Saroj Beyer is a 31 y.o.  male with Dx: Right knee pain [M25.561], signs and symptoms consistent with R mechanical knee pain following a patellar dislocation. Pt reports to PT after a traumatic dislocation while playing basketball 17. Pt reports cutting to the R and his patella dislocating medially. Pt denies any previous dislocations or preceding knee pain. Pt's x-ray was interpreted as negative for any bony or ligamentous damage. Pt was issued an immobilizer at the ER, but reports he has not needed to use axillary crutches or the immobilizer since his last MD appointment. Pt reported less pain and therefore started ambulating with no AD. Objective: The pt demonstrates minimal MMT deficits below and a lack of lateral translation of the patella with OKC knee extension. The patella if anything seems hypomobile B and has no pain with any palpation or ligamentous integrity testing. The pt does lack AROM with 5-110 deg on the R and 0-110 deg on the L.  PROM of the R knee is 0-115 deg and L 0-115 deg. The pt ambulates with a slight hip hike and trendeleberg pattern B, but does maintain his LE's in a position of 30-40 deg hip ER leading to a false genu varus deformity. Strength (1-5)      Left Right   Hip Flexion 4+ 4+     Extension 4- 4-     Abduction 4- 4+     Adduction  IR/ER 4-  4+/4+ 4-  4+/4+   Knee Flexion 5 5     Extension 5 4-   Ankle Plantarflexion         Dorsiflexion 5 5     FOTO score 57 points indicating 43% limitation in functional ability. Patient would benefit from skilled PT to address the below listed impairments.  Thank you for your referral.  ===========================================================================================  Eval Complexity: History: MEDIUM  Complexity : 1-2 comorbidities / personal factors will impact the outcome/ POC Exam:MEDIUM Complexity : 3 Standardized tests and measures addressing body structure, function, activity limitation and / or participation in recreation  Presentation: LOW Complexity : Stable, uncomplicated  Clinical Decision Making:MEDIUM Complexity : FOTO score of 26-74Overall Complexity:LOW     Problem List: pain affecting function, decrease ROM, decrease strength, impaired gait/ balance, decrease ADL/ functional abilitiies, decrease activity tolerance, decrease flexibility/ joint mobility and decrease transfer abilities   Treatment Plan may include any combination of the following: Therapeutic exercise, Therapeutic activities, Neuromuscular re-education, Physical agent/modality, Gait/balance training, Manual therapy, Aquatic therapy, Patient education, Self Care training, Functional mobility training, Home safety training and Stair training  Patient / Family readiness to learn indicated by: asking questions, trying to perform skills and interest  Persons(s) to be included in education: patient (P)  Barriers to Learning/Limitations: None  Measures taken: na   Patient Goal (s): \"I want to be able to run again.\"   Patient self reported health status: good  Rehabilitation Potential: good   Short Term Goals: To be accomplished in  2-3  weeks:  1. Patient will demonstrate compliance with HEP for symptom management at home. 2. Patient will demonstrate at least 120 deg of knee flexion AAROM B to increase efficiency with stair negotiation. 3. Patient will be able to demonstrate a SLR with full extension AROM to indicate sufficiency eccentric control of the quadriceps with WB ADL's.  Long Term Goals: To be accomplished in  4-6  weeks:  1. Patient will be independent with HEP to self-manage and prevent symptoms upon DC. 2.  Patient will improve FOTO score by 19 points to indicate improved functional status. 3.  Patient will demonstrate at least 4+ hip extension MMT B to increase pelvic stability with ambulation  4. Patient will demonstrate 5/5 squats with appropriate form and no increase in symptoms to increase safety with work duties. Frequency / Duration:   Patient to be seen  2-3  times per week for 4-6  weeks:  Patient / Caregiver education and instruction: self care, activity modification and exercises  G-Codes (GP): MALOU  Therapist Signature: Eveline Huitron DPT Date: 9/62/7079   Certification Period: 6/16/17 - 9/15/17 Time: 5:29 PM   ===========================================================================================  I certify that the above Physical Therapy Services are being furnished while the patient is under my care. I agree with the treatment plan and certify that this therapy is necessary. Physician Signature:        Date:       Time:     Please sign and return to [employer] or you may fax the signed copy to [phone]. Thank you.

## 2017-06-16 NOTE — PROGRESS NOTES
PHYSICAL THERAPY - DAILY TREATMENT NOTE    Patient Name: Aiden Mcleod        Date: 2017  : 1990   YES Patient  Verified  Visit #:   1     Insurance: Payor: Marina Sauer / Plan: Joy NGUYỄN / Product Type: Commerical /      In time: 10:10 Out time: 11:00   Total Treatment Time: 50     Medicare Time Tracking (below)   Total Timed Codes (min):  NA 1:1 Treatment Time:  NA     TREATMENT AREA =  R knee    SUBJECTIVE    Pain Level (on 0 to 10 scale):  3  / 10   Medication Changes/New allergies or changes in medical history, any new surgeries or procedures? YES    If yes, update Summary List   Subjective Functional Status/Changes:  []  No changes reported     History of Condition: Pt reports to PT after a traumatic dislocation while playing basketball 17. Pt reports cutting to the R and his patella dislocating medially. Pt denies any previous dislocations or preceding knee pain. Pt's x-ray was interpreted as negative for any bony or ligamentous damage. Pt has worn a knee immobilizer for only a couple days and was using crutches. Pt reported less pain and therefore started ambulating with no AD. Aggravating Factors: NA    Alleviating Factors: ice    Previous Treatment: NA    PMHx:see chart    Social/Recreational/Work: , basketball once a week, lift weights (bench press)    Pt Goals: \"I want to be able to run again. \"    FOTO: 62         OBJECTIVE  Physical Therapy Evaluation - Knee    Gait:  [] Normal    [] Abnormal    [] Antalgic    [] NWB    Device: NA    Describe: antaligic, trendelnberg and hip hike B, with B hips ER    ROM / Strength  [] Unable to assess                  AROM                      PROM                   Strength (1-5)    Left Right Left Right Left Right   Hip Flexion     4+ 4+    Extension     4- 4-    Abduction     4- 4+    Adduction  IR/ER     4-  4+/4+ 4-  4+/4+   Knee Flexion 110 110 115 115 5 5    Extension     5 4-   Ankle Plantarflexion Dorsiflexion     5 5       Flexibility: [] Unable to assess at this time  Hamstrings:    (L) Tightness= [] WNL   [x] Min   [] Mod   [] Severe    (R) Tightness= [] WNL   [x] Min   [] Mod   [] Severe  Quadriceps:    (L) Tightness= [] WNL   [] Min   [x] Mod   [] Severe    (R) Tightness= [] WNL   [] Min   [x] Mod   [] Severe  Gastroc:      (L) Tightness= [] WNL   [x] Min   [] Mod   [] Severe    (R) Tightness= [] WNL   [x] Min   [] Mod   [] Severe  Other:    Palpation:   Neg/Pos  Neg/Pos  Neg/Pos   Joint Line  Quad tendon  Patellar ligament    Patella  Fibular head  Pes Anserinus    Tibial tubercle  Hamstring tendons  Infrapatellar fat pad      Optional Tests:   Patellar Positioning (Static)   []L []R Normal []L []R Lateral   []L []R Elex Arlin      []L []R Medial   []L []R Baja    Patellar Tracking   []L []R Glide (Lat)   []L []R Tilt (Lat)     []L []R Glide (Med)  []L []R Tilt (Med)      []L []R Tile (Inf)     Patellar Mobility   []L []R Hypermobile [x]L [x]R Hypomobile         Girth Measurements in cm:      4 in above midpatella   2 in above midpatella  at  midpatella  2 in below midpatella   4 in below midpatella   Left        Right           Lachmans  [] Neg    [] Pos Posterior Drawer [] Neg    [] Pos  Pivot Shift  [] Neg    [] Pos Posterior Sag  [] Neg    [] Pos  Alexandre's Test [] Neg    [] Pos Arlette's Test  [] Neg    [] Pos  Squat   [] Neg    [] Pos          Ely's Test             [] Neg    [] Pos  Valgus@ 0 Degrees [] Neg    [] Pos Andreina [] Neg    [] Pos  Valgus@ 30 Degrees [] Neg    [] Pos Patellar Apprehension[] Neg    [] Pos  Varus@ 0 Degrees [] Neg    [] Pos Apley's Compression [] Neg    [] Pos  Varus@ 30 Degrees [] Neg    [] Pos Apley's Distraction [] Neg    [] Pos  Anterior Drawer [] Neg    [] Pos Other:                  [] Neg    [] Pos               Modalities Rationale:   palliative     min [] Estim, type/location:                                      []  att     []  unatt     []  w/US     [] w/ice    []  w/heat    min []  Mechanical Traction: type/lbs                   []  pro   []  sup   []  int   []  cont    []  before manual    []  after manual    min []  Ultrasound, settings/location:      min []  Iontophoresis w/ dexamethasone, location:                                               []  take home patch       []  in clinic   10 min [x]  Ice     []  Heat    location/position:     min []  Vasopneumatic Device, press/temp:     min []  Other:    [] Skin assessment post-treatment (if applicable):    []  intact    []  redness- no adverse reaction     []redness - adverse reaction:      25 min Therapeutic Exercise:  [x]  See flow sheet   Rationale:      increase ROM and increase strength to improve the patients ability to perform ADL's with improved eccentric control of the quadriceps. 8 min Therapeutic Activity: FOTO administration, squat mechanics   Rationale:    To increase safety and efficiency with ADL's.   min Patient Education:  YES  Reviewed HEP   []  Progressed/Changed HEP based on:   HEP issued      Other Objective/Functional Measures:    See above     Post Treatment Pain Level (on 0 to 10) scale:   3  / 10     ASSESSMENT    Assessment/Changes in Function:     Justification for Eval Code Complexity: LOW  Patient History (low 0, mod 1-2, high 3-4): increased BMI, traumatic dislocation MODERATE   Examination (low 1-2, mod 3+, high 4+): LE AROM, LE MMT, squat mechanics MODERATE   Clinical Presentation (low: stable/uncomplicated; mod: evolving; high: unstable/unpredictable): uncomplicated LOW  Clinical Decision Making (low , mod 26-74, high 1-25): 57 MODERATE     []  See Progress Note/Recertification   Patient will continue to benefit from skilled PT services to modify and progress therapeutic interventions, address functional mobility deficits, address ROM deficits, address strength deficits, analyze and address soft tissue restrictions, analyze and cue movement patterns and analyze and modify body mechanics/ergonomics to attain remaining goals.    Progress toward goals / Updated goals:    Goals established, See PoC     PLAN    [x]  Upgrade activities as tolerated YES Continue plan of care   []  Discharge due to :    [x]  Other: Initiate PoC     Therapist: Jessica Blue    Date: 6/16/2017 Time: 10:15 AM

## 2017-06-20 ENCOUNTER — OFFICE VISIT (OUTPATIENT)
Dept: ORTHOPEDIC SURGERY | Age: 27
End: 2017-06-20

## 2017-06-20 VITALS
HEIGHT: 68 IN | OXYGEN SATURATION: 100 % | TEMPERATURE: 98.4 F | SYSTOLIC BLOOD PRESSURE: 143 MMHG | DIASTOLIC BLOOD PRESSURE: 75 MMHG | RESPIRATION RATE: 16 BRPM | HEART RATE: 73 BPM | BODY MASS INDEX: 35.77 KG/M2 | WEIGHT: 236 LBS

## 2017-06-20 DIAGNOSIS — S83.004D PATELLAR DISLOCATION, RIGHT, SUBSEQUENT ENCOUNTER: Primary | ICD-10-CM

## 2017-06-20 NOTE — PATIENT INSTRUCTIONS
Kneecap Dislocation: Care Instructions  Your Care Instructions    A sudden twisting or a blow can cause the kneecap (patella) to move out of its normal position. This is called a dislocation. It can happen because of a sports injury--such as turning suddenly while running--or an accident. Rest and home treatment can help you heal and return to your normal activity, usually within 3 to 6 weeks. But you need to be careful after you heal. Now that your kneecap has been dislocated, it can more easily go out of position again. Follow-up care is a key part of your treatment and safety. Be sure to make and go to all appointments, and call your doctor if you are having problems. It's also a good idea to know your test results and keep a list of the medicines you take. How can you care for yourself at home? · Take pain medicines exactly as directed. ¨ If the doctor gave you a prescription medicine for pain, take it as prescribed. ¨ If you are not taking a prescription pain medicine, ask your doctor if you can take an over-the-counter medicine. · Rest your knee by not putting weight on your leg until your doctor says it is okay. · Follow instructions for using crutches. · Your doctor may recommend a brace (immobilizer) or elastic bandage to support your knee while it heals. Wear it as directed. · If you have an elastic bandage, make sure it is snug but not so tight that your leg is numb, tingles, or swells below the bandage. You can loosen the bandage if it is too tight. · Put ice or a cold pack on your knee for 10 to 20 minutes at a time. Try to do this every 1 to 2 hours for the next 3 days (when you are awake) or until the swelling goes down. Put a thin cloth between the ice pack and your skin. Do not get the brace or elastic bandage wet. · Prop up your leg on a pillow when you ice it or anytime you sit or lie down for the next 3 days. Try to keep it above the level of your heart.  This will help reduce swelling. · Go to physical therapy if your doctor suggests it. Follow your therapist's instruction for home exercises. When should you call for help? Call 911 anytime you think you may need emergency care. For example, call if:  · You have sudden chest pain and shortness of breath, or you cough up blood. Call your doctor now or seek immediate medical care if:  · You have signs that your kneecap may be dislocated again, including:  ¨ Severe pain. ¨ A misshapen knee that looks like a bone is out of position. ¨ Not being able to bend or straighten the knee. ¨ Not being able to walk or bear weight on the knee. · Your foot is cool or pale or changes color. · You cannot feel or move your toes or ankle. · You have signs of a blood clot, such as:  ¨ Pain in your calf, back of the knee, thigh, or groin. ¨ Redness and swelling in your leg. Watch closely for changes in your health, and be sure to contact your doctor if:  · Your pain and swelling get worse. Where can you learn more? Go to http://jenna-wilian.info/. Enter Q327 in the search box to learn more about \"Kneecap Dislocation: Care Instructions. \"  Current as of: March 21, 2017  Content Version: 11.3  © 2017-6307 Healthwise, Incorporated. Care instructions adapted under license by [employer] (which disclaims liability or warranty for this information). If you have questions about a medical condition or this instruction, always ask your healthcare professional. [place] [address] any warranty or liability for your use of this information.

## 2017-06-20 NOTE — MR AVS SNAPSHOT
Visit Information Date & Time Provider Department Dept. Phone Encounter #  
 6/20/2017 10:20 AM Sandee Vaughan, [address] [facility] [phone] [id number] Follow-up Instructions Return in about 3 weeks (around 7/11/2017) for pateelar dislocation f/u. Upcoming Health Maintenance Date Due DTaP/Tdap/Td series (1 - Tdap) 10/31/2010 INFLUENZA AGE 9 TO ADULT 8/1/2017 Allergies as of 6/20/2017  Review Complete On: 6/20/2017 By: Sandee Vaughan, DO No Known Allergies Current Immunizations  Never Reviewed No immunizations on file. Not reviewed this visit You Were Diagnosed With   
  
 Codes Comments Patellar dislocation, right, subsequent encounter    -  Primary ICD-10-CM: S83.004D ICD-9-CM: V54.89, 836.3 Vitals BP Pulse Temp Resp Height(growth percentile) Weight(growth percentile) 143/75 (BP 1 Location: Right arm, BP Patient Position: Sitting) 73 98.4 °F (36.9 °C) (Oral) 16 5' 8\" (1.727 m) 236 lb (107 kg) SpO2 BMI Smoking Status 100% 35.88 kg/m2 Never Smoker BMI and BSA Data Body Mass Index Body Surface Area  
 35.88 kg/m 2 2.27 m 2 Preferred Pharmacy Pharmacy Name Phone [id number] [address], [address] [place] [phone] Your Updated Medication List  
  
   
This list is accurate as of: 6/20/17 10:18 AM.  Always use your most recent med list.  
  
  
  
  
 chlorpheniramine-HYDROcodone 10-8 mg/5 mL suspension Commonly known as:  [employer] Arts Take 5 mL by mouth every twelve (12) hours as needed for Cough. HYDROcodone-acetaminophen 7.5-325 mg per tablet Commonly known as:  Dunkirk Garzon Take 1 Tab by mouth every eight (8) hours as needed for Pain for up to 21 days. Max Daily Amount: 3 Tabs. ibuprofen 800 mg tablet Commonly known as:  MOTRIN Take 1 Tab by mouth every eight (8) hours as needed for Pain. MUCINEX 600 mg SR tablet Generic drug:  guaiFENesin SR Take 600 mg by mouth two (2) times a day. Follow-up Instructions Return in about 3 weeks (around 7/11/2017) for pateelar dislocation f/u. To-Do List   
 06/20/2017 10:20 AM  
  Appointment with Sandee Vaughan DO at [address] [place] [address], [address] and [facility] ([phone])  
  
 06/20/2017 3:00 PM  
  Appointment with Jessica Blue at [facility] [address]  ([phone]) Patient Instructions Kneecap Dislocation: Care Instructions Your Care Instructions A sudden twisting or a blow can cause the kneecap (patella) to move out of its normal position. This is called a dislocation. It can happen because of a sports injurysuch as turning suddenly while runningor an accident. Rest and home treatment can help you heal and return to your normal activity, usually within 3 to 6 weeks. But you need to be careful after you heal. Now that your kneecap has been dislocated, it can more easily go out of position again. Follow-up care is a key part of your treatment and safety. Be sure to make and go to all appointments, and call your doctor if you are having problems. It's also a good idea to know your test results and keep a list of the medicines you take. How can you care for yourself at home? · Take pain medicines exactly as directed. ¨ If the doctor gave you a prescription medicine for pain, take it as prescribed. ¨ If you are not taking a prescription pain medicine, ask your doctor if you can take an over-the-counter medicine. · Rest your knee by not putting weight on your leg until your doctor says it is okay. · Follow instructions for using crutches. · Your doctor may recommend a brace (immobilizer) or elastic bandage to support your knee while it heals. Wear it as directed. · If you have an elastic bandage, make sure it is snug but not so tight that your leg is numb, tingles, or swells below the bandage.  You can loosen the bandage if it is too tight. · Put ice or a cold pack on your knee for 10 to 20 minutes at a time. Try to do this every 1 to 2 hours for the next 3 days (when you are awake) or until the swelling goes down. Put a thin cloth between the ice pack and your skin. Do not get the brace or elastic bandage wet. · Prop up your leg on a pillow when you ice it or anytime you sit or lie down for the next 3 days. Try to keep it above the level of your heart. This will help reduce swelling. · Go to physical therapy if your doctor suggests it. Follow your therapist's instruction for home exercises. When should you call for help? Call 911 anytime you think you may need emergency care. For example, call if: 
· You have sudden chest pain and shortness of breath, or you cough up blood. Call your doctor now or seek immediate medical care if: 
· You have signs that your kneecap may be dislocated again, including: ¨ Severe pain. ¨ A misshapen knee that looks like a bone is out of position. ¨ Not being able to bend or straighten the knee. ¨ Not being able to walk or bear weight on the knee. · Your foot is cool or pale or changes color. · You cannot feel or move your toes or ankle. · You have signs of a blood clot, such as: 
¨ Pain in your calf, back of the knee, thigh, or groin. ¨ Redness and swelling in your leg. Watch closely for changes in your health, and be sure to contact your doctor if: 
· Your pain and swelling get worse. Where can you learn more? Go to http://jenna-wilian.info/. Enter E506 in the search box to learn more about \"Kneecap Dislocation: Care Instructions. \" Current as of: March 21, 2017 Content Version: 11.3 © 8001-8463 Healthwise, Incorporated. Care instructions adapted under license by [employer] (which disclaims liability or warranty for this information).  If you have questions about a medical condition or this instruction, always ask your healthcare professional. [place] [address] any warranty or liability for your use of this information. Introducing [facility]! [employer] introduces [employer] patient portal. Now you can access parts of your medical record, email your doctor's office, and request medication refills online. 1. In your internet browser, go to [email or web address] 2. Click on the First Time User? Click Here link in the Sign In box. You will see the New Member Sign Up page. 3. Enter your [employer] Access Code exactly as it appears below. You will not need to use this code after youve completed the sign-up process. If you do not sign up before the expiration date, you must request a new code. · [employer] Access Code: [email or web address] Expires: 9/6/2017 10:17 AM 
 
4. Enter the last four digits of your Social Security Number (xxxx) and Date of Birth (mm/dd/yyyy) as indicated and click Submit. You will be taken to the next sign-up page. 5. Create a [employer] ID. This will be your [employer] login ID and cannot be changed, so think of one that is secure and easy to remember. 6. Create a [employer] password. You can change your password at any time. 7. Enter your Password Reset Question and Answer. This can be used at a later time if you forget your password. 8. Enter your e-mail address. You will receive e-mail notification when new information is available in [address] 9. Click Sign Up. You can now view and download portions of your medical record. 10. Click the Download Summary menu link to download a portable copy of your medical information. If you have questions, please visit the Frequently Asked Questions section of the [employer] website. Remember, [employer] is NOT to be used for urgent needs. For medical emergencies, dial 911. Now available from your iPhone and Android! Please provide this summary of care documentation to your next provider. Your primary care clinician is listed as Ila Mcdonald. If you have any questions after today's visit, please call [phone].

## 2017-06-20 NOTE — PROGRESS NOTES
HISTORY OF PRESENT ILLNESS    Sarwat Rock is a 31y.o. year old male comes in today to be evaluated and treated for: Right knee pain    Since last appt has been doing well and pain down to 1/10 and is able to drive and stairs w/o much issue. PT going well and ready to return to work. Plan about 1 more week PT. Rare use norco and not when driving. Social History     Social History    Marital status:      Spouse name: N/A    Number of children: N/A    Years of education: N/A     Social History Main Topics    Smoking status: Never Smoker    Smokeless tobacco: Never Used    Alcohol use No    Drug use: No    Sexual activity: Yes     Partners: Female     Birth control/ protection: Condom     Other Topics Concern    Not on file     Social History Narrative     Current Outpatient Prescriptions   Medication Sig Dispense Refill    HYDROcodone-acetaminophen (NORCO) 7.5-325 mg per tablet Take 1 Tab by mouth every eight (8) hours as needed for Pain for up to 21 days. Max Daily Amount: 3 Tabs. 63 Tab 0    ibuprofen (MOTRIN) 800 mg tablet Take 1 Tab by mouth every eight (8) hours as needed for Pain. 90 Tab 1    chlorpheniramine-HYDROcodone (TUSSIONEX) 8-10 mg/5 mL suspension Take 5 mL by mouth every twelve (12) hours as needed for Cough. 120 mL 0    guaifenesin SR (MUCINEX) 600 mg SR tablet Take 600 mg by mouth two (2) times a day. No past medical history on file. No family history on file. ROS:  See HPI. No bruise, swell improved    Objective:  Visit Vitals    /75 (BP 1 Location: Right arm, BP Patient Position: Sitting)    Pulse 73    Temp 98.4 °F (36.9 °C) (Oral)    Resp 16    Ht 5' 8\" (1.727 m)    Wt 236 lb (107 kg)    SpO2 100%    BMI 35.88 kg/m2     GEN: Appears stated age in NAD. HEAD:  Normocephalic, atraumatic. NEURO:  Sensation intact light touch B/L lower extremities. MS:  LE Strength +5/5 bilateral .   right Knee:  1+ Effusion . Lachman negative. Valgus negative. Varus negative. negative joint line tenderness medial.  Lexi negative. Posterior drawer negative. Noble compression test negative. Patellar apprehension negative. Patellar facet  negative tender to palpation medial no crepitance bilateral .  EXT: no clubbing/cyanosis. no edema. SKIN: Warm/dry without rash. Assessment/Plan:     ICD-10-CM ICD-9-CM    1. Patellar dislocation, right, subsequent encounter S83.004D V54.89      836.3      Patient verbalizes understanding of evaluation and plan. Will continue PT and letter to allow work w/o restrictions tomorrow and will RTC 3-4 weeks.

## 2017-06-20 NOTE — LETTER
NOTIFICATION RETURN TO WORK / SCHOOL 
 
6/20/2017 10:14 AM 
 
Mr. Omari Estrella [address] Merg[facility] with [facility] [phone] To Whom It May Concern: 
 
Omari Estrella is currently under the care of [address] [place] [address]. He will return to work/school on: 6/21/17 without restrictions If there are questions or concerns please have the patient contact our office.  
 
 
 
Sincerely, 
 
 
Endy Gillis, DO

## 2017-06-20 NOTE — PROGRESS NOTES
1. Have you been to the ER, urgent care clinic since your last visit? Hospitalized since your last visit? Yes PT    2. Have you seen or consulted any other health care providers outside of the [address] [place] [address] since your last visit? Include any pap smears or colon screening.  No

## 2017-07-06 NOTE — PROGRESS NOTES
[facility] PHYSICAL THERAPY  [address] [place] [address] Rosalie Dacosta, [address] - Phone: [phone]  Fax: [phone]      Dear Dr. Gio Javier, DO,   Under your direction, we have been providing physical therapy for your patient Narda Rodriguez, for a diagnosis of Right knee pain [M25.561]. The patient was scheduled for several visits after his initial evaluation. He attended none of his follow up sessions, and was last seen on 6/16/17 for his evaluation. .He has not communicated with us his intentions regarding PT. Due to the inability to further his care from non-compliance to our attendance policy and POC, we are discharging the patient from physical therapy at this time. .     We appreciate the kind referral and would willingly work with this patient again, should he be able to arrange regular attendance and is appropriate for further treatment. Your patient's health is our primary concern. If you have any questions/comments please contact us directly at [phone]. Thank you for allowing us to assist in the care of your patient. Therapist Signature: Shabnam Wilkinson DPT Date: 7/6/2017   Reporting Period 6/16/17 - 7/6/17 Time: 11:02 AM   NOTE TO PHYSICIAN:  PLEASE COMPLETE THE ORDERS BELOW AND FAX TO   [facility]: [phone]  If you are unable to process this request in 24 hours please contact our office: [phone]    ___ I have read the above report and request that my patient continue as recommended.   ___ I have read the above report and request that my patient continue therapy with the following changes/special instructions:_________________________________________________________   ___ I have read the above report and request that my patient be discharged from therapy.      Physician Signature: Shabnam Wilkinson DPT   Date:       Time: